# Patient Record
Sex: MALE | Race: WHITE
[De-identification: names, ages, dates, MRNs, and addresses within clinical notes are randomized per-mention and may not be internally consistent; named-entity substitution may affect disease eponyms.]

---

## 2021-09-26 ENCOUNTER — HOSPITAL ENCOUNTER (EMERGENCY)
Dept: HOSPITAL 8 - ED | Age: 26
Discharge: HOME | End: 2021-09-26
Payer: SELF-PAY

## 2021-09-26 VITALS — HEIGHT: 75 IN | WEIGHT: 165.35 LBS | BODY MASS INDEX: 20.56 KG/M2

## 2021-09-26 VITALS — SYSTOLIC BLOOD PRESSURE: 118 MMHG | DIASTOLIC BLOOD PRESSURE: 85 MMHG

## 2021-09-26 DIAGNOSIS — F10.220: Primary | ICD-10-CM
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
## 2021-09-26 LAB
ALBUMIN SERPL-MCNC: 3.7 G/DL (ref 3.4–5)
ANION GAP SERPL CALC-SCNC: 6 MMOL/L (ref 5–15)
BASOPHILS # BLD AUTO: 0.1 X10^3/UL (ref 0–0.1)
BASOPHILS NFR BLD AUTO: 1 % (ref 0–1)
CALCIUM SERPL-MCNC: 8.8 MG/DL (ref 8.5–10.1)
CHLORIDE SERPL-SCNC: 109 MMOL/L (ref 98–107)
CREAT SERPL-MCNC: 0.8 MG/DL (ref 0.7–1.3)
EOSINOPHIL # BLD AUTO: 0.3 X10^3/UL (ref 0–0.4)
EOSINOPHIL NFR BLD AUTO: 4 % (ref 1–7)
ERYTHROCYTE [DISTWIDTH] IN BLOOD BY AUTOMATED COUNT: 14.1 % (ref 9.4–14.8)
LYMPHOCYTES # BLD AUTO: 2.4 X10^3/UL (ref 1–3.4)
LYMPHOCYTES NFR BLD AUTO: 29 % (ref 22–44)
MCH RBC QN AUTO: 31.8 PG (ref 27.5–34.5)
MCHC RBC AUTO-ENTMCNC: 34.1 G/DL (ref 33.2–36.2)
MONOCYTES # BLD AUTO: 0.5 X10^3/UL (ref 0.2–0.8)
MONOCYTES NFR BLD AUTO: 6 % (ref 2–9)
NEUTROPHILS # BLD AUTO: 5.1 X10^3/UL (ref 1.8–6.8)
NEUTROPHILS NFR BLD AUTO: 61 % (ref 42–75)
PLATELET # BLD AUTO: 333 X10^3/UL (ref 130–400)
PMV BLD AUTO: 8 FL (ref 7.4–10.4)
RBC # BLD AUTO: 5.21 X10^6/UL (ref 4.38–5.82)
VANCOMYCIN TROUGH SERPL-MCNC: < 1.7 MG/DL (ref 2.8–20)

## 2021-09-26 PROCEDURE — 82040 ASSAY OF SERUM ALBUMIN: CPT

## 2021-09-26 PROCEDURE — G0480 DRUG TEST DEF 1-7 CLASSES: HCPCS

## 2021-09-26 PROCEDURE — 80320 DRUG SCREEN QUANTALCOHOLS: CPT

## 2021-09-26 PROCEDURE — 80048 BASIC METABOLIC PNL TOTAL CA: CPT

## 2021-09-26 PROCEDURE — 80329 ANALGESICS NON-OPIOID 1 OR 2: CPT

## 2021-09-26 PROCEDURE — 80307 DRUG TEST PRSMV CHEM ANLYZR: CPT

## 2021-09-26 PROCEDURE — 80299 QUANTITATIVE ASSAY DRUG: CPT

## 2021-09-26 PROCEDURE — 85025 COMPLETE CBC W/AUTO DIFF WBC: CPT

## 2021-09-26 PROCEDURE — 36415 COLL VENOUS BLD VENIPUNCTURE: CPT

## 2021-09-26 PROCEDURE — 99283 EMERGENCY DEPT VISIT LOW MDM: CPT

## 2021-09-26 NOTE — NUR
MOM CALLED THIS RN STATING THAT PATIENT CANT BE DISCHARGED BECAUSE HE IS GOING 
TO KILL HIMSELF. MOM STATES "YOU DONT UNDERSTAND MY MOM KILLED HERSELF IT RUNS 
IN THE FAMILY." I EXPLAINED TO MOM THAT HE STATES HE IS NOT STATING SI/ HI AND 
THAT THE MD DECIDED HE WAS SAFE TO BE DISCHARGED AT THIS TIME. 



MOM REQUESTED PATIENT TO CALL HER. PATIENT AWARE MOM WOULD LIKE TO SPEAK TO HIM 
BECAUSE THATS WHERE HE WILL BE GOING AT DISCHARGE.

## 2021-09-26 NOTE — NUR
Patient given discharge instructions and they have confirmed that they 
understand the instructions.  Patient ambulatory with steady gait. NAD, all 
questions answered appropriately, denies additional needs at this time. No 
personal belongings left in room after discharge. 



Patient states mom is coming to  patient.

## 2021-09-26 NOTE — NUR
PATIENT PLACED IN GOWN. ROOM SECURE. PATIENT RESTING WITH EYES CLOSED. NAD. 1:1 
SITTER IN PLACE. WILL CONTINUE TO MONITOR.

## 2021-09-26 NOTE — NUR
PT BIBA. PER EMS FOR SI/ETOH. PT DENIES SI/HI UPON ARRIVAL. PT STATES "I JUST 
DRANK TOO MUCH". PT REPORTS THAT HE THINKS HE HAS BIPOLAR/SCHIZOPHRENIA BUT HAS 
NEVER BEEN DIAGNOSED WITH IT. PT REPORTS THAT HE HAS HAD SI AND SA IN THE PAST, 
BUT DOESN'T HAVE A PLAN AT THIS TIME. PT RESTING IN [facility], MONITORING IN 
PLACE, NADN AT THIS TIME, [facility].

## 2021-09-26 NOTE — NUR
MOM BROUGHT PHONE BACK TO PATIENT. RN TO BEDSIDE TO ASSIST PATIENT IN CALLING 
BOSS TO LET HIM KNOW THAT HE WONT BE AT WORK TOMORROW. PATIENT PHONE NOW LOCKED 
AWAY AND TURNED OFF. DENIES ANY OTHER NEEDS AT THIS TIME. 1:1 SITTER REMAINS IN 
PLACE. WILL CONTINUE TO MONITOR.

## 2021-09-26 NOTE — NUR
PATIENT OK TO DC PER MD SKINNER. PATIENT BELONGINGS GIVEN TO PATIENT. PATIENT 
STATES HE WILL CALL EX-GIRLFRIEND TO COME PICK HIM UP.